# Patient Record
Sex: MALE | Race: WHITE | URBAN - METROPOLITAN AREA
[De-identification: names, ages, dates, MRNs, and addresses within clinical notes are randomized per-mention and may not be internally consistent; named-entity substitution may affect disease eponyms.]

---

## 2017-06-23 ENCOUNTER — NURSE TRIAGE (OUTPATIENT)
Dept: NURSING | Facility: CLINIC | Age: 22
End: 2017-06-23

## 2017-06-24 NOTE — TELEPHONE ENCOUNTER
Additional Information    Negative: Eye exposure to chemical or fumes    Negative: Redness of white of eye (sclera), but no pus or only a small amount of brief pus    Negative: SEVERE eye pain (e.g., excruciating)    Negative: [1] Eyelids are very swollen (shut or almost) AND [2] fever    Negative: [1] Eyelid (outer) is very red (or tender to touch) AND [2] fever    Negative: Patient sounds very sick or weak to the triager    Negative: MODERATE eye pain (e.g., interferes with normal activities)    Negative: Fever > 104 F (40 C)    Negative: Cloudy spot or sore seen on the cornea (clear part of the eye)    Negative: Blurred vision    Negative: Eye is very swollen (shut or almost)    Negative: [1] MILD eye pain or discomfort AND [2] wears contacts    Negative: Eyelid is red and painful (or tender to touch)    Negative: Discharge from penis    Negative: New or abnormal vaginal discharge    Negative: Fever present > 3 days (72 hours)    Negative: [1] Lots of yellow or green nasal discharge AND [2] present now AND [3] fever    Negative: Weak immune system (e.g., HIV positive, cancer chemo, splenectomy, organ transplant, chronic steroids)    Negative: [1] Eye with yellow/green discharge or eyelashes stick together AND [2] NO PCP standing order to call in antibiotic eye drops    Negative: [1] Using antibiotic eyedrops AND [2] eyes have become very itchy (especially after eyedrops are put in)    Negative: [1] Taking oral antibiotic > 48 hours (2 days) AND [2] pus in eye persists    Negative: [1] Using antibiotic eyedrops > 72 hours (3 days) AND [2] pus in eye persists    Negative: Bleeding on white of the eye    [1] Very small amount of discharge AND [2] only in corner of eye  (all triage questions negative)    Protocols used: EYE - PUS OR DISCHARGE-ADULT-AH    Pt. Calls and says that he has white drainage in the eye. Symptom began this AM. Eye is itchy, too. Pt. Says that he thinks he may have pink eye. Pt. Was triaged  Consult for [facility] placed   and also given the phone # to [facility]..

## 2017-06-25 ENCOUNTER — APPOINTMENT (OUTPATIENT)
Dept: GENERAL RADIOLOGY | Facility: CLINIC | Age: 22
End: 2017-06-25
Attending: FAMILY MEDICINE
Payer: COMMERCIAL

## 2017-06-25 ENCOUNTER — HOSPITAL ENCOUNTER (EMERGENCY)
Facility: CLINIC | Age: 22
Discharge: HOME | End: 2017-06-25
Attending: FAMILY MEDICINE | Admitting: FAMILY MEDICINE
Payer: COMMERCIAL

## 2017-06-25 VITALS
HEART RATE: 88 BPM | TEMPERATURE: 97.5 F | WEIGHT: 175 LBS | RESPIRATION RATE: 16 BRPM | DIASTOLIC BLOOD PRESSURE: 85 MMHG | OXYGEN SATURATION: 99 % | BODY MASS INDEX: 23.73 KG/M2 | SYSTOLIC BLOOD PRESSURE: 128 MMHG

## 2017-06-25 DIAGNOSIS — H10.33 ACUTE CONJUNCTIVITIS OF BOTH EYES, UNSPECIFIED ACUTE CONJUNCTIVITIS TYPE: ICD-10-CM
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
## 2017-06-25 DIAGNOSIS — M22.02 RECURRENT DISLOCATION OF PATELLA, LEFT KNEE: ICD-10-CM

## 2017-06-25 DIAGNOSIS — S83.005A: ICD-10-CM

## 2017-06-25 PROCEDURE — 99284 EMERGENCY DEPT VISIT MOD MDM: CPT | Mod: Z6 | Performed by: FAMILY MEDICINE

## 2017-06-25 PROCEDURE — 99283 EMERGENCY DEPT VISIT LOW MDM: CPT | Performed by: FAMILY MEDICINE

## 2017-06-25 PROCEDURE — 73560 X-RAY EXAM OF KNEE 1 OR 2: CPT | Mod: LT

## 2017-06-25 PROCEDURE — 25000132 ZZH RX MED GY IP 250 OP 250 PS 637: Performed by: FAMILY MEDICINE

## 2017-06-25 RX ORDER — POLYMYXIN B SULFATE AND TRIMETHOPRIM 1; 10000 MG/ML; [USP'U]/ML
1 SOLUTION OPHTHALMIC
COMMUNITY
End: 2017-07-10

## 2017-06-25 RX ORDER — OXYCODONE HYDROCHLORIDE 5 MG/1
5 TABLET ORAL EVERY 6 HOURS PRN
Qty: 20 TABLET | Refills: 0 | Status: SHIPPED | OUTPATIENT
Start: 2017-06-25

## 2017-06-25 RX ORDER — OXYCODONE HYDROCHLORIDE 5 MG/1
10 TABLET ORAL ONCE
Status: COMPLETED | OUTPATIENT
Start: 2017-06-25 | End: 2017-06-25

## 2017-06-25 RX ORDER — IBUPROFEN 800 MG/1
800 TABLET, FILM COATED ORAL EVERY 8 HOURS PRN
Qty: 20 TABLET | Refills: 0 | Status: SHIPPED | OUTPATIENT
Start: 2017-06-25 | End: 2017-07-03

## 2017-06-25 RX ADMIN — OXYCODONE HYDROCHLORIDE 10 MG: 5 TABLET ORAL at 17:05

## 2017-06-25 NOTE — ED AVS SNAPSHOT
[facility], [place], Emergency Department    [address]    [facility] [place] [place] [address]    Phone:  [phone]    Fax:  [phone]                                       Castro Delgado   MRN: [id number]    Department:  [facility], Emergency Department   Date of Visit:  6/25/2017           After Visit Summary Signature Page     I have received my discharge instructions, and my questions have been answered. I have discussed any challenges I see with this plan with the nurse or doctor.    ..........................................................................................................................................  Patient/Patient Representative Signature      ..........................................................................................................................................  Patient Representative Print Name and Relationship to Patient    ..................................................               ................................................  Date                                            Time    ..........................................................................................................................................  Reviewed by Signature/Title    ...................................................              ..............................................  Date                                                            Time

## 2017-06-25 NOTE — ED AVS SNAPSHOT
[facility], Emergency Department    [address]    [place] [address]    Phone:  [phone]    Fax:  [phone]                                       Castro Delgado   MRN: [id number]    Department:  [facility], Emergency Department   Date of Visit:  6/25/2017           Patient Information     Date Of Birth          1995        Your diagnoses for this visit were:     Dislocation of patella, left, initial encounter     Acute conjunctivitis of both eyes, unspecified acute conjunctivitis type        You were seen by Nathanael Ralph MD.        Discharge Instructions       Thank you for choosing [facility].     Please closely monitor for further symptoms. Return to the Emergency Department if you develop any new or worsening signs or symptoms.    If you received any opiate pain medications or sedatives during your visit, please do not drive for at least 8 hours.     Labs, cultures or final xray interpretations may still need to be reviewed.  We will call you if your plan of care needs to be changed.    Please make an appointment to follow up with Orthopedics (phone: [phone]) in 7-10 days.      Patella (Kneecap) Dislocation or Subluxation, Reduced  Your kneecap (patella) is held in place by ligaments and tendons. The kneecap can slide to the side of the knee joint if it is hit with a strong force. This sliding is called subluxation or dislocation. In a dislocation, the kneecap moves farther away from its normal position.    Sometimes the kneecap will move back in place by itself. Otherwise, a health care provider will have to move it back into place (reduce it) for you. As a result of this injury, the ligaments and tendons around the kneecap are torn or stretched. It will take about 4 to 6 weeks for these tissues to heal. During this time, the knee must be protected to prevent another injury.  Once a patella dislocation or subluxation has occurred, it is more  likely to happen again. This is because the tissues around the kneecap have been weakened. Wear a knee brace or padded shield when playing sports that have a high risk for knee injury. These sports include soccer, skateboarding, football, skiing, and snowboarding. These devices help support your knee and lower your risk for further injury. An important part of your treatment will be to begin rehabilitation and strengthening exercises as soon as possible.  Home care  Follow these guidelines when caring for yourself at home:    You may be given a knee immobilizer. This will keep you from moving your for the first few weeks. Unless otherwise advised, you may take this device off to bathe and sleep. But wear it when you are out of bed, for the prescribed time. Your health care provider will often have you wear a knee brace (patellar restraining brace) after you are done with the immobilizer.    If you were not given a knee immobilizer, you can use an elastic tubular knee brace. This will give support while your knee heals. You can buy this kind of brace at [employer]. Use crutches to help you walk, if they were prescribed.    Put an ice pack on the injured area. Do this for 20 minutes every 1 to 2 hours the first day for pain relief. You can make an ice pack by wrapping a plastic bag of ice cubes in a thin towel. Continue using the ice pack 3 to 4 times a day for the next 2 days. Then use the ice pack as needed to ease pain and swelling.    You may use acetaminophen or ibuprofen to control pain, unless another pain medicine was prescribed. If you have chronic liver or kidney disease, talk with your health care provider before using these medicines. Also talk with your provider if you ve had a stomach ulcer or GI bleeding.    Don t take part in sports or physical education until your health care provider says it s OK to do so. Limit impact activities like walking or bending if they cause pain.  Follow-up care  Follow up  with your health care provider in the next few days, or as advised.  If X-rays were taken, a radiologist will look at them. You will be told of any new findings that may affect your care.  When to seek medical advice  Call your health care provider right away if any of these occur:    Knee pain or swelling gets worse    You can t bend your knee because of pain or because the joint locks    Redness or warmth over the knee    Pus or fluid drains from any scrape on the knee    You can t put weight on the injured leg because of pain    It feels like your knee is wobbly and might give out   Date Last Reviewed: 2/17/2015 2000-2017 [employer]. [address] [place] [address], [place] [address]. All rights reserved. This information is not intended as a substitute for professional medical care. Always follow your healthcare professional's instructions.          24 Hour Appointment Hotline       To make an appointment at any [facility], call [phone] ([phone]). If you don't have a family doctor or clinic, we will help you find one. [facility] are conveniently located to serve the needs of you and your family.          ED Discharge Orders     Crutches       Use gait belt during crutch training.                     Review of your medicines      START taking        Dose / Directions Last dose taken    ibuprofen 800 MG tablet   Commonly known as:  ADVIL/MOTRIN   Dose:  800 mg   Quantity:  20 tablet        Take 1 tablet (800 mg) by mouth every 8 hours as needed for moderate pain   Refills:  0          CONTINUE these medicines which may have CHANGED, or have new prescriptions. If we are uncertain of the size of tablets/capsules you have at home, strength may be listed as something that might have changed.        Dose / Directions Last dose taken    oxyCODONE 5 MG IR tablet   Commonly known as:  ROXICODONE   Dose:  5 mg   What changed:    - how much to take  - when to take this  - reasons to take  this   Quantity:  20 tablet        Take 1 tablet (5 mg) by mouth every 6 hours as needed for pain   Refills:  0          Our records show that you are taking the medicines listed below. If these are incorrect, please call your family doctor or clinic.        Dose / Directions Last dose taken    acetaminophen 500 MG tablet   Commonly known as:  TYLENOL   Dose:  1000 mg   Quantity:  100 tablet        Take 2 tablets (1,000 mg) by mouth 3 times daily   Refills:  0        trimethoprim-polymyxin b ophthalmic solution   Commonly known as:  POLYTRIM   Dose:  1 drop        Place 1 drop into both eyes every 3 hours   Refills:  0                Prescriptions were sent or printed at these locations (2 Prescriptions)                   Other Prescriptions                Printed at Department/Unit printer (2 of 2)         oxyCODONE (ROXICODONE) 5 MG IR tablet               ibuprofen (ADVIL/MOTRIN) 800 MG tablet                Procedures and tests performed during your visit     Knee XR, 1-2 views, left      Orders Needing Specimen Collection     None      Pending Results     Date and Time Order Name Status Description    6/25/2017 1657 Knee XR, 1-2 views, left Preliminary             Pending Culture Results     No orders found from 6/23/2017 to 6/26/2017.            Pending Results Instructions     If you had any lab results that were not finalized at the time of your Discharge, you can call the ED Lab Result RN at [phone]. You will be contacted by this team for any positive Lab results or changes in treatment. The nurses are available 7 days a week from 10A to 6:30P.  You can leave a message 24 hours per day and they will return your call.        Thank you for choosing [facility]       Thank you for choosing [facility] for your care. Our goal is always to provide you with excellent care. Hearing back from our patients is one way we can continue to improve our services. Please take a few minutes to complete the written survey  "that you may receive in the mail after you visit with us. Thank you!        [facility] Information     [employer] lets you send messages to your doctor, view your test results, renew your prescriptions, schedule appointments and more. To sign up, go to [email or web address] . Click on \"Log in\" on the left side of the screen, which will take you to the Welcome page. Then click on \"Sign up Now\" on the right side of the page.     You will be asked to enter the access code listed below, as well as some personal information. Please follow the directions to create your username and password.     Your access code is: [email or web address]  Expires: 2017  6:12 PM     Your access code will  in 90 days. If you need help or a new code, please call your [facility] clinic or [phone].        Care EveryWhere ID     This is your Care EveryWhere ID. This could be used by other organizations to access your [facility] medical records  [id number]        Equal Access to Services     BECKI LOVE : Hadrudolph cunhao Somauricio, waaxda luqadaha, qaybta kaalmada calin, danelle brunner . So [facility] [phone].    ATENCIÓN: Si habla español, tiene a combs disposición servicios gratuitos de asistencia lingüística. Llame al [phone].    We comply with applicable federal civil rights laws and [place] laws. We do not discriminate on the basis of race, color, national origin, age, disability sex, sexual orientation or gender identity.            After Visit Summary       This is your record. Keep this with you and show to your community pharmacist(s) and doctor(s) at your next visit.                  "

## 2017-06-25 NOTE — DISCHARGE INSTRUCTIONS
Thank you for choosing [facility].     Please closely monitor for further symptoms. Return to the Emergency Department if you develop any new or worsening signs or symptoms.    If you received any opiate pain medications or sedatives during your visit, please do not drive for at least 8 hours.     Labs, cultures or final xray interpretations may still need to be reviewed.  We will call you if your plan of care needs to be changed.    Please make an appointment to follow up with Orthopedics (phone: [phone]) in 7-10 days.      Patella (Kneecap) Dislocation or Subluxation, Reduced  Your kneecap (patella) is held in place by ligaments and tendons. The kneecap can slide to the side of the knee joint if it is hit with a strong force. This sliding is called subluxation or dislocation. In a dislocation, the kneecap moves farther away from its normal position.    Sometimes the kneecap will move back in place by itself. Otherwise, a health care provider will have to move it back into place (reduce it) for you. As a result of this injury, the ligaments and tendons around the kneecap are torn or stretched. It will take about 4 to 6 weeks for these tissues to heal. During this time, the knee must be protected to prevent another injury.  Once a patella dislocation or subluxation has occurred, it is more likely to happen again. This is because the tissues around the kneecap have been weakened. Wear a knee brace or padded shield when playing sports that have a high risk for knee injury. These sports include soccer, skateboarding, football, skiing, and snowboarding. These devices help support your knee and lower your risk for further injury. An important part of your treatment will be to begin rehabilitation and strengthening exercises as soon as possible.  Home care  Follow these guidelines when caring for yourself at home:    You may be given a knee immobilizer. This will keep you from moving your  for the first few weeks. Unless otherwise advised, you may take this device off to bathe and sleep. But wear it when you are out of bed, for the prescribed time. Your health care provider will often have you wear a knee brace (patellar restraining brace) after you are done with the immobilizer.    If you were not given a knee immobilizer, you can use an elastic tubular knee brace. This will give support while your knee heals. You can buy this kind of brace at [employer]. Use crutches to help you walk, if they were prescribed.    Put an ice pack on the injured area. Do this for 20 minutes every 1 to 2 hours the first day for pain relief. You can make an ice pack by wrapping a plastic bag of ice cubes in a thin towel. Continue using the ice pack 3 to 4 times a day for the next 2 days. Then use the ice pack as needed to ease pain and swelling.    You may use acetaminophen or ibuprofen to control pain, unless another pain medicine was prescribed. If you have chronic liver or kidney disease, talk with your health care provider before using these medicines. Also talk with your provider if you ve had a stomach ulcer or GI bleeding.    Don t take part in sports or physical education until your health care provider says it s OK to do so. Limit impact activities like walking or bending if they cause pain.  Follow-up care  Follow up with your health care provider in the next few days, or as advised.  If X-rays were taken, a radiologist will look at them. You will be told of any new findings that may affect your care.  When to seek medical advice  Call your health care provider right away if any of these occur:    Knee pain or swelling gets worse    You can t bend your knee because of pain or because the joint locks    Redness or warmth over the knee    Pus or fluid drains from any scrape on the knee    You can t put weight on the injured leg because of pain    It feels like your knee is wobbly and might give out   Date Last  Reviewed: 2/17/2015 2000-2017 [employer]. [address] [place] [address], [place] [address]. All rights reserved. This information is not intended as a substitute for professional medical care. Always follow your healthcare professional's instructions.

## 2017-06-25 NOTE — ED PROVIDER NOTES
"  History     Chief Complaint   Patient presents with     Knee Pain     Onset today at 11 am slipped and fell injuring left knee, remains painful.     Eye Pain     \"I have pink eye and have been on antibiotics for 36 hours and my eyes remain swollen and painful.\"     TY Delgado is a 21 year old male who presents for evaluation of left knee pain and bilateral eye pain. First, patient reports he was at a restaurant this morning, and when he walked out of the restaurant bathroom slipped and fell on some water, causing him to dislocate his left patella. EMS responded to the restaurant and evaluated the patient at which time they were able to reduce the patient's patella. Currently, he complains of persistent left knee pain with associated swelling. No other injuries from the fall. No numbness, weakness, or tingling of his left lower extremity. No left hip or left ankle pain. He did buy a knee brace from [employer] which he is wearing. He has taken Tylenol for his pain with minimal relief. 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    Second, patient complains of bilateral eye redness, swollen eyelids, and some mild drainage. He states his eye redness, swelling, and drainage initially began only in his right eye on Friday morning, two days ago. He was seen and evaluated in his clinic for this at which time he was given antibiotic eye drops. Since Friday morning, he complains the symptoms have spread to his left eye. He does report the symptoms in his right eye seem to be resolving. No vision changes or photophobia. He denies eye pain.       I have reviewed the Medications, Allergies, Past Medical and Surgical History, and Social History in the [employer] system.  History reviewed. No pertinent past medical history.    Past Surgical History:   Procedure Laterality Date     LAPAROSCOPIC APPENDECTOMY N/A 3/22/2016    Procedure: LAPAROSCOPIC APPENDECTOMY;  Surgeon: Omero Carson MD;  Location:  OR "       No family history on file.    Social History   Substance Use Topics     Smoking status: Never Smoker     Smokeless tobacco: Never Used     Alcohol use Yes      Comment: social     No current facility-administered medications for this encounter.      Current Outpatient Prescriptions   Medication     trimethoprim-polymyxin b (POLYTRIM) ophthalmic solution     oxyCODONE (ROXICODONE) 5 MG IR tablet     ibuprofen (ADVIL/MOTRIN) 800 MG tablet     acetaminophen (TYLENOL) 500 MG tablet     Facility-Administered Medications Ordered in Other Encounters   Medication     ondansetron (ZOFRAN) injection        Allergies   Allergen Reactions     Amoxicillin Swelling     Penicillins        Review of Systems  ROS: 14 point ROS neg other than the symptoms noted above in the HPI.  Physical Exam   BP: 133/75  Pulse: 107  Heart Rate: 107  Temp: 97.5  F (36.4  C)  Resp: 16  Weight: 79.4 kg (175 lb)  SpO2: 96 %  Physical Exam   Constitutional: He is oriented to person, place, and time. He appears well-developed and well-nourished.   HENT:   Head: Normocephalic and atraumatic.   Mouth/Throat: Oropharynx is clear and moist.   Eyes: EOM are normal. Pupils are equal, round, and reactive to light. Lids are everted and swept, no foreign bodies found. Left eye exhibits discharge. Right conjunctiva is injected. Left conjunctiva is injected.   Normal.  No sign of abrasion or foreign body.  No pain with pupillary accommodation.  No pain with extraocular movements.   Neck: Normal range of motion. Neck supple. No tracheal deviation present. No thyromegaly present.   Cardiovascular: Normal rate, regular rhythm, normal heart sounds and intact distal pulses.  Exam reveals no gallop and no friction rub.    No murmur heard.  Pulmonary/Chest: Effort normal and breath sounds normal. He exhibits no tenderness.   Abdominal: Soft. Bowel sounds are normal. He exhibits no distension and no mass. There is no tenderness.   Musculoskeletal: He exhibits no  edema.        Left knee: He exhibits effusion. Tenderness found.        Legs:  Neurological: He is alert and oriented to person, place, and time. No cranial nerve deficit. Coordination normal.   Skin: Skin is warm and dry. No rash noted.   Psychiatric: He has a normal mood and affect. His behavior is normal.   Nursing note and vitals reviewed.      ED Course     ED Course     Procedures       5:00 PM  The patient was seen and examined by Dr. Ralph in Room 18.          Critical Care time:  none               Labs Ordered and Resulted from Time of ED Arrival Up to the Time of Departure from the ED - No data to display         Assessments & Plan (with Medical Decision Making)   Patient has a history of prior patellar dislocations presenting with a reported lateral dislocation of the patella which was reduced on scene by EMS.  Here he has tenderness and apprehension with palpation of the patella but his tendon is intact.  There is no sign of any other injury.  His x-rays are unremarkable.  He already has purchased a knee brace, was provided with crutches, analgesics, and will follow up with orthopedics.  His 2nd complaint is consistent with conjunctivitis, as is his exam.  He does not wear contact lenses.  There is no visual change or photophobia.  There is no sign of injury to the cornea, no sign of scleritis, episcleritis, iritis, glaucoma, or other more concerning causes of red eyes.  There is no sign of any significant periorbital, orbital, or lid cellulitis.  He will continue with his Polytrim drops.  Discussed expected course, need for follow up, and indications for return with the patient.  See discharge instructions.    I have reviewed the nursing notes.    I have reviewed the findings, diagnosis, plan and need for follow up with the patient.    New Prescriptions    IBUPROFEN (ADVIL/MOTRIN) 800 MG TABLET    Take 1 tablet (800 mg) by mouth every 8 hours as needed for moderate pain    OXYCODONE (ROXICODONE) 5 MG  IR TABLET    Take 1 tablet (5 mg) by mouth every 6 hours as needed for pain       Final diagnoses:   Dislocation of patella, left, initial encounter   Acute conjunctivitis of both eyes, unspecified acute conjunctivitis type   I, Danielle Noble, am serving as a trained medical scribe to document services personally performed by Ugo Ralph MD, based on the provider's statements to me.   I, Ugo Ralph MD, was physically present and have reviewed and verified the accuracy of this note documented by Danielle Noble.      6/25/2017   [facility], [place], EMERGENCY DEPARTMENT     Nathanael Ralph MD  06/25/17 2082

## 2017-06-29 ENCOUNTER — PRE VISIT (OUTPATIENT)
Dept: ORTHOPEDICS | Facility: CLINIC | Age: 22
End: 2017-06-29

## 2017-06-29 NOTE — TELEPHONE ENCOUNTER
1.  Date/reason for appt: 7/10/17 - Left Knee Dislocation   2.  Referring provider: ED/Hosp  3.  Call to patient (Yes / No - short description): no, hosp f/u  4.  Previous care at / records requested from: [facility] ED/Hosp -- ED note 6/25/17 & XR L Knee in Epic

## 2017-07-10 ENCOUNTER — OFFICE VISIT (OUTPATIENT)
Dept: ORTHOPEDICS | Facility: CLINIC | Age: 22
End: 2017-07-10

## 2017-07-10 DIAGNOSIS — S83.005A PATELLAR DISLOCATION, LEFT, INITIAL ENCOUNTER: Primary | ICD-10-CM

## 2017-07-10 ASSESSMENT — ENCOUNTER SYMPTOMS
BACK PAIN: 1
NECK PAIN: 1
JOINT SWELLING: 1
MYALGIAS: 1
STIFFNESS: 1

## 2017-07-10 NOTE — LETTER
7/10/2017       RE: Castro Delgado  [address]  [facility] [address]     Dear Colleague,    Thank you for referring your patient, Castro Delgado, to the [facility]TH[facility] at [facility]. Please see a copy of my visit note below.    Castro is a pleasant 21-year-old male who presents to my clinic today for evaluation of his left knee  On 6/25/2017 he slipped on water while in the bathroom he had onset of pain. He's had subluxations in the past but no definite dislocations.  He states that this is never been a recurrent problem and he has not had profound apprehension. He feels improved since the injury.      Past medical history none    Medications none    Allergies amoxicillin and penicillin    Family history none    Social history he is a nonsmoker he is a clinical signs and communication major here at the [employer]    Physical exam    Castro is a pleasant male in no acute distress he is articulate and interactive. Visual inspection shows 1+ effusion about his knee. 3 quadrant lateral translation 2 quadrant medial. Lachman 0 no pivot shift stable to varus valgus stress at 0 and 30 . Neurovascularly intact distally    Imaging - no fractures or dislocations    Clinical assessment patellar dislocation    Plan long discussion today with Castro this time are limited him started the physical therapy program for strengthening range of motion and functional return to desired activities. We are going to get him a patellar stabilization brace. I will plan to see him back in my orthopedic clinic in approximately 6-8 weeks to assess his progress.    Again, thank you for allowing me to participate in the care of your patient.      Sincerely,    Omar Yan MD

## 2017-07-10 NOTE — MR AVS SNAPSHOT
After Visit Summary   7/10/2017    Castro Delgado    MRN: [id number]           Patient Information     Date Of Birth          1995        Visit Information        Provider Department      7/10/2017 11:10 AM Omar Yan MD [facility]        Today's Diagnoses     Patellar dislocation, left, initial encounter    -  1       Follow-ups after your visit        Additional Services     PHYSICAL THERAPY REFERRAL (Internal)       Physical Therapy Referral                  Follow-up notes from your care team     Return in about 6 weeks (around 2017).      Who to contact     Please call your clinic at [phone] to:    Ask questions about your health    Make or cancel appointments    Discuss your medicines    Learn about your test results    Speak to your doctor   If you have compliments or concerns about an experience at your clinic, or if you wish to file a complaint, please contact [facility] Patient Relations at [phone] or email us at [email or web address]         Additional Information About Your Visit        MyChart Information     [employer] is an electronic gateway that provides easy, online access to your medical records. With [employer], you can request a clinic appointment, read your test results, renew a prescription or communicate with your care team.     To sign up for [employer] visit the website at [email or web address]   You will be asked to enter the access code listed below, as well as some personal information. Please follow the directions to create your username and password.     Your access code is: [email or web address]  Expires: 2017  6:12 PM     Your access code will  in 90 days. If you need help or a new code, please contact your [facility] Clinic or call [phone] for assistance.        Care EveryWhere ID     This is your Care EveryWhere ID. This could be used by other  organizations to access your [place] medical records  [id number]         Blood Pressure from Last 3 Encounters:   06/25/17 128/85   03/23/16 122/60    Weight from Last 3 Encounters:   06/25/17 79.4 kg (175 lb)   03/22/16 77.1 kg (170 lb)              We Performed the Following     PHYSICAL THERAPY REFERRAL (Internal)          Today's Medication Changes          These changes are accurate as of: 7/10/17  2:02 PM.  If you have any questions, ask your nurse or doctor.               Stop taking these medicines if you haven't already. Please contact your care team if you have questions.     trimethoprim-polymyxin b ophthalmic solution   Commonly known as:  POLYTRIM                    Primary Care Provider    Physician No Ref-Primary       No address on file        Equal Access to Services     BECKI LOVE : Mukul Levi, scot muir, ghada kaalmakelsi layton, danelle tsang. So [facility] [phone].    ATENCIÓN: Si habla español, tiene a combs disposición servicios gratuitos de asistencia lingüística. Llame al [phone].    We comply with applicable federal civil rights laws and [place] laws. We do not discriminate on the basis of race, color, national origin, age, disability sex, sexual orientation or gender identity.            Thank you!     Thank you for choosing [facility]  for your care. Our goal is always to provide you with excellent care. Hearing back from our patients is one way we can continue to improve our services. Please take a few minutes to complete the written survey that you may receive in the mail after your visit with us. Thank you!             Your Updated Medication List - Protect others around you: Learn how to safely use, store and throw away your medicines at www.disposemymeds.org.          This list is accurate as of: 7/10/17  2:02 PM.  Always use your most recent med list.                   Brand Name Dispense Instructions for  use Diagnosis    acetaminophen 500 MG tablet    TYLENOL    100 tablet    Take 2 tablets (1,000 mg) by mouth 3 times daily    Acute appendicitis, uncomplicated       oxyCODONE 5 MG IR tablet    ROXICODONE    20 tablet    Take 1 tablet (5 mg) by mouth every 6 hours as needed for pain

## 2017-07-10 NOTE — LETTER
July 10, 2017     Seen today: yes    Patient:  Castro Delgado  :   1995  MRN:     [id number]  Physician: TAVON YAN    Castro Delgado was seen in clinic today for a knee injury. Please allow him to sit as needed at work; standing as tolerated.    Patient limitations:  Stand only as tolerated.        Electronically signed by Tavon Yan MD

## 2017-07-10 NOTE — NURSING NOTE
Reason For Visit:   Chief Complaint   Patient presents with     Musculoskeletal Problem     ED follow up, DOI: 6/25/17, left patellar dislocation     Age: 21 year old    Currently working? Yes.  Work status?  Full time.  Date of injury: 6/25/17, slipped on water in bathroom    Has experienced previous possible subluxations    Date of surgery: no surgery    Smoker: No    Pain Assessment  Patient Currently in Pain: Yes  Primary Pain Location: Knee  Pain Orientation: Left  Pain Descriptors: Discomfort, Swelling  Alleviating Factors: Pain medication, NSAIDS  Aggravating Factors: Walking, Bending

## 2017-07-10 NOTE — PROGRESS NOTES
Castro is a pleasant 21-year-old male who presents to my clinic today for evaluation of his left knee  On 6/25/2017 he slipped on water while in the bathroom he had onset of pain. He's had subluxations in the past but no definite dislocations.  He states that this is never been a recurrent problem and he has not had profound apprehension. He feels improved since the injury.      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 Visual inspection shows 1+ effusion about his knee. 3 quadrant lateral translation 2 quadrant medial. Lachman 0 no pivot shift stable to varus valgus stress at 0 and 30 . Neurovascularly intact distally    Imaging - no fractures or dislocations    Clinical assessment patellar dislocation    Plan long discussion today with Castro this time are limited him started the physical therapy program for strengthening range of motion and functional return to desired activities. We are going to get him a patellar stabilization brace. I will plan to see him back in my orthopedic clinic in approximately 6-8 weeks to assess his progress.

## 2017-08-16 ENCOUNTER — THERAPY VISIT (OUTPATIENT)
Dept: PHYSICAL THERAPY | Facility: CLINIC | Age: 22
End: 2017-08-16
Payer: COMMERCIAL

## 2017-08-16 DIAGNOSIS — M25.562 KNEE PAIN, LEFT: Primary | ICD-10-CM

## 2017-08-16 DIAGNOSIS — M25.469 EFFUSION OF KNEE: ICD-10-CM

## 2017-08-16 DIAGNOSIS — S83.005D PATELLAR DISLOCATION, LEFT, SUBSEQUENT ENCOUNTER: ICD-10-CM

## 2017-08-16 PROCEDURE — 97110 THERAPEUTIC EXERCISES: CPT | Mod: GP | Performed by: PHYSICAL THERAPIST

## 2017-08-16 PROCEDURE — 97016 VASOPNEUMATIC DEVICE THERAPY: CPT | Mod: GP | Performed by: PHYSICAL THERAPIST

## 2017-08-16 PROCEDURE — 97161 PT EVAL LOW COMPLEX 20 MIN: CPT | Mod: GP | Performed by: PHYSICAL THERAPIST

## 2017-08-16 ASSESSMENT — ACTIVITIES OF DAILY LIVING (ADL)
KNEE_ACTIVITY_OF_DAILY_LIVING_SCORE: 40
KNEEL ON THE FRONT OF YOUR KNEE: I AM UNABLE TO DO THE ACTIVITY
RISE FROM A CHAIR: ACTIVITY IS SOMEWHAT DIFFICULT
GO UP STAIRS: ACTIVITY IS FAIRLY DIFFICULT
HOW_WOULD_YOU_RATE_THE_CURRENT_FUNCTION_OF_YOUR_KNEE_DURING_YOUR_USUAL_DAILY_ACTIVITIES_ON_A_SCALE_FROM_0_TO_100_WITH_100_BEING_YOUR_LEVEL_OF_KNEE_FUNCTION_PRIOR_TO_YOUR_INJURY_AND_0_BEING_THE_INABILITY_TO_PERFORM_ANY_OF_YOUR_USUAL_DAILY_ACTIVITIES?: 60
STIFFNESS: THE SYMPTOM AFFECTS MY ACTIVITY MODERATELY
PAIN: THE SYMPTOM AFFECTS MY ACTIVITY MODERATELY
AS_A_RESULT_OF_YOUR_KNEE_INJURY,_HOW_WOULD_YOU_RATE_YOUR_CURRENT_LEVEL_OF_DAILY_ACTIVITY?: ABNORMAL
WALK: ACTIVITY IS SOMEWHAT DIFFICULT
GIVING WAY, BUCKLING OR SHIFTING OF KNEE: I HAVE THE SYMPTOM BUT IT DOES NOT AFFECT MY ACTIVITY
KNEE_ACTIVITY_OF_DAILY_LIVING_SUM: 28
LIMPING: THE SYMPTOM AFFECTS MY ACTIVITY SEVERELY
STAND: ACTIVITY IS SOMEWHAT DIFFICULT
RAW_SCORE: 28
SQUAT: I AM UNABLE TO DO THE ACTIVITY
GO DOWN STAIRS: ACTIVITY IS VERY DIFFICULT
WEAKNESS: THE SYMPTOM AFFECTS MY ACTIVITY MODERATELY
SIT WITH YOUR KNEE BENT: ACTIVITY IS SOMEWHAT DIFFICULT
HOW_WOULD_YOU_RATE_THE_OVERALL_FUNCTION_OF_YOUR_KNEE_DURING_YOUR_USUAL_DAILY_ACTIVITIES?: ABNORMAL
SWELLING: THE SYMPTOM AFFECTS MY ACTIVITY MODERATELY

## 2017-08-16 NOTE — PROGRESS NOTES
[facility] Initial Evaluation    Subjective:    Patient is a 21 year old male presenting with rehab left knee hpi. The history is provided by the patient.   Castro Delgado is a 21 year old male with a left knee condition.  Condition occurred with:  A fall/slip (Slipped on wet floor with the right lower extremity leading to a fall and left patellar dislocation.).  Condition occurred: in the community.  This is a new condition  06/18/17  Left patellar dislocation.    Patient reports pain:  Anterior.    Pain is described as aching and is intermittent and reported as 5/10.  Associated symptoms:  Loss of motion/stiffness and loss of strength. Pain is worse in the P.M..  Symptoms are exacerbated by bending/squatting, kneeling and walking and relieved by NSAID's and rest.  Since onset symptoms are unchanged.  Special tests:  X-ray and MRI (No fractures, no tears).      General health as reported by patient is fair.  Pertinent medical history includes:  Depression (Anxiety).  Medical allergies: yes (omixicillin/penicillin).  Other surgeries include:  Orthopedic surgery (surgery on broken forearm approximately 10 years ago).  Current medications:  None as reported by the patient.  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  .  Patient is working in normal job with restrictions.  Primary job tasks include:  Prolonged standing.    Barriers include:  Stairs.    Red flags:  None as reported by the patient.                        Objective:    Standing Alignment:              Knee:  Genu valgus R and genu valgus L      Gait:    Gait Type:  Antalgic   Assistive Devices:  Brace      Flexibility/Screens:   Negative screens: Hip                                                      Hip Evaluation  HIP AROM:  AROM:    Left Hip:     Normal    Right Hip:   Normal                  Hip PROM:  Hip PROM:  Left Hip:    Normal  Right Hip:  Normal                          Hip Strength:    Flexion:   Left: 4/5   Pain:   Right: 4/5   Pain:                    Extension:  Left: 4/5  Pain:Right: 4/5    Pain:    Abduction:  Left: 4/5     Pain:Right: 4/5    Pain:  Adduction:  Left: 4+/5    Pain:Right: 4+/5   Pain:                Hip Special Testing:   Special tests hip not assessed: SLR to approximately 70 degrees bilterally.    Left hip negative for the following special tests:  SLR  Right hip negative for the following special tests:  SLR           Knee Evaluation:  ROM:    AROM      Extension:  Left: 0    Right:  0  Flexion: Left: 105    Right: 140-    Pain: Pain at end range on the Left    Strength:     Extension:  Left: 4+/5   Pain:      Right: 4+/5   Pain:  Flexion:  Left: 4+/5   Pain:      Right: 4+/5   Pain:    Quad Set Left: Fair    Pain:   Quad Set Right: Good    Pain:  Ligament Testing:  Normal              Lachman's:  Left:  Neg  Right:  Neg  Special Tests:     Left knee negative for the following special tests:  Patellar compression    Right knee negative for the following special tests:  Patellar Compression  Palpation:    Left knee tenderness present at:  Patellar Medial; Patellar Lateral; Patellar Superior and Patellar Inferior    Edema:    Circumference:    10 cm Distal:  Right:  2+  Joint Line:  Right:  2+  15 cm Prox:  Right:  2+  Mobility Testing:  Normal            Functional Testing:          Quad:    Single Leg Squat:  Left:       Right:        Bilateral Leg Squat:  Unable to squat below 45 degrees due to pain.  Significant loss of control and excessive anterior knee excursion              General     ROS    Assessment/Plan:      Patient is a 21 year old male with left side knee complaints.    Patient has the following significant findings with corresponding treatment plan.                Diagnosis 1:  Left Knee pain due to patellar dislocation  Pain -  hot/cold therapy, splint/taping/bracing/orthotics, self management, education and home program  Decreased ROM/flexibility - manual therapy, therapeutic exercise and  home program  Decreased strength - therapeutic exercise, therapeutic activities and home program  Inflammation - cold therapy and self management/home program  Impaired muscle performance - neuro re-education and home program  Decreased function - therapeutic activities and home program    Therapy Evaluation Codes:   1) History comprised of:   Personal factors that impact the plan of care:      Anxiety.    Comorbidity factors that impact the plan of care are:      Depression.     Medications impacting care: None.  2) Examination of Body Systems comprised of:   Body structures and functions that impact the plan of care:      Knee.   Activity limitations that impact the plan of care are:      Squatting/kneeling and Standing.  3) Clinical presentation characteristics are:   Stable/Uncomplicated.  4) Decision-Making    Low complexity using standardized patient assessment instrument and/or measureable assessment of functional outcome.  Cumulative Therapy Evaluation is: Low complexity.    Previous and current functional limitations:  (See Goal Flow Sheet for this information)    Short term and Long term goals: (See Goal Flow Sheet for this information)     Communication ability:  Patient appears to be able to clearly communicate and understand verbal and written communication and follow directions correctly.  Treatment Explanation - The following has been discussed with the patient:   RX ordered/plan of care  Anticipated outcomes  Possible risks and side effects  This patient would benefit from PT intervention to resume normal activities.   Rehab potential is good.    Frequency:  1 X week, once daily  Duration:  for 8 weeks  Discharge Plan:  Achieve all LTG.  Independent in home treatment program.  Reach maximal therapeutic benefit.    Please refer to the daily flowsheet for treatment today, total treatment time and time spent performing 1:1 timed codes.

## 2017-08-16 NOTE — MR AVS SNAPSHOT
"              After Visit Summary   8/16/2017    Castro Delgado    MRN: [id number]           Patient Information     Date Of Birth          1995        Visit Information        Provider Department      8/16/2017 12:40 PM Eric Gaines PT M [facility] Physical Therapy [facility]        Today's Diagnoses     Knee pain, left    -  1       Follow-ups after your visit        Your next 10 appointments already scheduled     Aug 24, 2017  8:40 AM CDT   [facility] Extremity with CYDNEY [place] [facility] Physical Therapy [facility] ([facility] [place])    [address] [place] [id number] [phone]              Who to contact     If you have questions or need follow up information about today's clinic visit or your schedule please contact [facility] PHYSICAL THERAPY [facility] directly at [phone].  Normal or non-critical lab and imaging results will be communicated to you by [employer], letter or phone within 4 business days after the clinic has received the results. If you do not hear from us within 7 days, please contact the clinic through [employer] or phone. If you have a critical or abnormal lab result, we will notify you by phone as soon as possible.  Submit refill requests through [employer] or call your pharmacy and they will forward the refill request to us. Please allow 3 business days for your refill to be completed.          Additional Information About Your Visit        MyChart Information     [employer] lets you send messages to your doctor, view your test results, renew your prescriptions, schedule appointments and more. To sign up, go to [email or web address] . Click on \"Log in\" on the left side of the screen, which will take you to the Welcome page. Then click on \"Sign up Now\" on the right side of the page.     You will be asked to enter the access code listed below, as well as some personal information. Please follow the directions to create your username and password.   "   Your access code is: [email or web address]  Expires: 2017  6:12 PM     Your access code will  in 90 days. If you need help or a new code, please call your [facility] or [phone].        Care EveryWhere ID     This is your Care EveryWhere ID. This could be used by other organizations to access your [place] medical records  [id number]         Blood Pressure from Last 3 Encounters:   17 128/85   16 122/60    Weight from Last 3 Encounters:   17 79.4 kg (175 lb)   16 77.1 kg (170 lb)              We Performed the Following     C VASOPNEUMATIC DEVICE     HC PT EVAL, LOW COMPLEXITY     THERAPEUTIC EXERCISES        Primary Care Provider    Physician No Ref-Primary       No address on file        Equal Access to Services     BECKI LOVE : Hadii yajaira cunhao Somauricio, waaxda luqadaha, qaybta kaalmada adeegyada, danelle brunner . So [facility] [phone].    ATENCIÓN: Si habla español, tiene a combs disposición servicios gratuitos de asistencia lingüística. Llame al [phone].    We comply with applicable federal civil rights laws and [place] laws. We do not discriminate on the basis of race, color, national origin, age, disability sex, sexual orientation or gender identity.            Thank you!     Thank you for choosing [facility] PHYSICAL THERAPY [facility]  for your care. Our goal is always to provide you with excellent care. Hearing back from our patients is one way we can continue to improve our services. Please take a few minutes to complete the written survey that you may receive in the mail after your visit with us. Thank you!             Your Updated Medication List - Protect others around you: Learn how to safely use, store and throw away your medicines at www.disposemymeds.org.          This list is accurate as of: 17  7:26 PM.  Always use your most recent med list.                   Brand Name Dispense Instructions for use Diagnosis    acetaminophen 500  MG tablet    TYLENOL    100 tablet    Take 2 tablets (1,000 mg) by mouth 3 times daily    Acute appendicitis, uncomplicated       oxyCODONE 5 MG IR tablet    ROXICODONE    20 tablet    Take 1 tablet (5 mg) by mouth every 6 hours as needed for pain

## 2017-08-17 PROBLEM — M25.469 EFFUSION OF KNEE: Status: ACTIVE | Noted: 2017-08-17

## 2017-08-17 PROBLEM — S83.005D PATELLAR DISLOCATION, LEFT, SUBSEQUENT ENCOUNTER: Status: ACTIVE | Noted: 2017-08-17

## 2017-08-24 ENCOUNTER — THERAPY VISIT (OUTPATIENT)
Dept: PHYSICAL THERAPY | Facility: CLINIC | Age: 22
End: 2017-08-24
Payer: COMMERCIAL

## 2017-08-24 DIAGNOSIS — M25.562 KNEE PAIN, LEFT: Primary | ICD-10-CM

## 2017-08-24 DIAGNOSIS — S83.005D PATELLAR DISLOCATION, LEFT, SUBSEQUENT ENCOUNTER: ICD-10-CM

## 2017-08-24 DIAGNOSIS — M25.469 EFFUSION OF KNEE: ICD-10-CM

## 2017-08-24 PROCEDURE — 97530 THERAPEUTIC ACTIVITIES: CPT | Mod: GP | Performed by: PHYSICAL THERAPIST

## 2017-08-24 PROCEDURE — 97110 THERAPEUTIC EXERCISES: CPT | Mod: GP | Performed by: PHYSICAL THERAPIST

## 2017-08-24 PROCEDURE — 97016 VASOPNEUMATIC DEVICE THERAPY: CPT | Mod: GP | Performed by: PHYSICAL THERAPIST

## 2017-09-07 ENCOUNTER — THERAPY VISIT (OUTPATIENT)
Dept: PHYSICAL THERAPY | Facility: CLINIC | Age: 22
End: 2017-09-07
Payer: COMMERCIAL

## 2017-09-07 DIAGNOSIS — M25.469 EFFUSION OF KNEE: ICD-10-CM

## 2017-09-07 DIAGNOSIS — M25.562 KNEE PAIN, LEFT: ICD-10-CM

## 2017-09-07 DIAGNOSIS — S83.005D PATELLAR DISLOCATION, LEFT, SUBSEQUENT ENCOUNTER: ICD-10-CM

## 2017-09-07 PROCEDURE — 97530 THERAPEUTIC ACTIVITIES: CPT | Mod: GP | Performed by: PHYSICAL THERAPY ASSISTANT

## 2017-09-07 PROCEDURE — 97110 THERAPEUTIC EXERCISES: CPT | Mod: GP | Performed by: PHYSICAL THERAPY ASSISTANT

## 2017-09-28 ENCOUNTER — THERAPY VISIT (OUTPATIENT)
Dept: PHYSICAL THERAPY | Facility: CLINIC | Age: 22
End: 2017-09-28
Payer: COMMERCIAL

## 2017-09-28 DIAGNOSIS — S83.005D PATELLAR DISLOCATION, LEFT, SUBSEQUENT ENCOUNTER: ICD-10-CM

## 2017-09-28 DIAGNOSIS — M25.562 KNEE PAIN, LEFT: ICD-10-CM

## 2017-09-28 DIAGNOSIS — M25.469 EFFUSION OF KNEE: ICD-10-CM

## 2017-09-28 PROCEDURE — 97530 THERAPEUTIC ACTIVITIES: CPT | Mod: GP | Performed by: PHYSICAL THERAPIST

## 2017-09-28 PROCEDURE — 97110 THERAPEUTIC EXERCISES: CPT | Mod: GP | Performed by: PHYSICAL THERAPIST

## 2017-10-12 ENCOUNTER — THERAPY VISIT (OUTPATIENT)
Dept: PHYSICAL THERAPY | Facility: CLINIC | Age: 22
End: 2017-10-12
Payer: COMMERCIAL

## 2017-10-12 DIAGNOSIS — M25.469 EFFUSION OF KNEE: ICD-10-CM

## 2017-10-12 DIAGNOSIS — M25.562 KNEE PAIN, LEFT: Primary | ICD-10-CM

## 2017-10-12 DIAGNOSIS — S83.005D PATELLAR DISLOCATION, LEFT, SUBSEQUENT ENCOUNTER: ICD-10-CM

## 2017-10-12 PROCEDURE — 97530 THERAPEUTIC ACTIVITIES: CPT | Mod: GP | Performed by: PHYSICAL THERAPY ASSISTANT

## 2017-10-12 PROCEDURE — 97110 THERAPEUTIC EXERCISES: CPT | Mod: GP | Performed by: PHYSICAL THERAPY ASSISTANT

## 2017-11-02 ENCOUNTER — THERAPY VISIT (OUTPATIENT)
Dept: PHYSICAL THERAPY | Facility: CLINIC | Age: 22
End: 2017-11-02
Payer: COMMERCIAL

## 2017-11-02 DIAGNOSIS — M25.469 EFFUSION OF KNEE: ICD-10-CM

## 2017-11-02 DIAGNOSIS — S83.005D PATELLAR DISLOCATION, LEFT, SUBSEQUENT ENCOUNTER: ICD-10-CM

## 2017-11-02 DIAGNOSIS — M25.562 KNEE PAIN, LEFT: ICD-10-CM

## 2017-11-02 PROCEDURE — 97110 THERAPEUTIC EXERCISES: CPT | Mod: GP | Performed by: PHYSICAL THERAPIST

## 2017-11-02 PROCEDURE — 97112 NEUROMUSCULAR REEDUCATION: CPT | Mod: GP | Performed by: PHYSICAL THERAPIST

## 2017-11-16 ENCOUNTER — THERAPY VISIT (OUTPATIENT)
Dept: PHYSICAL THERAPY | Facility: CLINIC | Age: 22
End: 2017-11-16
Payer: COMMERCIAL

## 2017-11-16 DIAGNOSIS — S83.005D PATELLAR DISLOCATION, LEFT, SUBSEQUENT ENCOUNTER: ICD-10-CM

## 2017-11-16 DIAGNOSIS — M25.462 EFFUSION OF LEFT KNEE: ICD-10-CM

## 2017-11-16 DIAGNOSIS — M25.562 KNEE PAIN, LEFT: ICD-10-CM

## 2017-11-16 PROCEDURE — 97112 NEUROMUSCULAR REEDUCATION: CPT | Mod: GP | Performed by: PHYSICAL THERAPIST

## 2017-11-16 PROCEDURE — 97110 THERAPEUTIC EXERCISES: CPT | Mod: GP | Performed by: PHYSICAL THERAPIST
